# Patient Record
Sex: FEMALE | Race: WHITE
[De-identification: names, ages, dates, MRNs, and addresses within clinical notes are randomized per-mention and may not be internally consistent; named-entity substitution may affect disease eponyms.]

---

## 2018-04-06 ENCOUNTER — HOSPITAL ENCOUNTER (EMERGENCY)
Dept: HOSPITAL 33 - ED | Age: 33
Discharge: HOME | End: 2018-04-06
Payer: COMMERCIAL

## 2018-04-06 VITALS — HEART RATE: 108 BPM | OXYGEN SATURATION: 98 %

## 2018-04-06 VITALS — SYSTOLIC BLOOD PRESSURE: 107 MMHG | DIASTOLIC BLOOD PRESSURE: 81 MMHG

## 2018-04-06 DIAGNOSIS — F32.9: Primary | ICD-10-CM

## 2018-04-06 LAB
ALBUMIN SERPL-MCNC: 4 G/DL (ref 3.5–5)
ALP SERPL-CCNC: 46 U/L (ref 38–126)
ALT SERPL-CCNC: 12 U/L (ref 0–35)
AMPHETAMINES UR QL: NEGATIVE
ANION GAP SERPL CALC-SCNC: 14.5 MEQ/L (ref 5–15)
APAP SPEC-MCNC: < 10 UG/ML (ref 10–30)
AST SERPL QL: 16 U/L (ref 14–36)
BARBITURATES UR QL: NEGATIVE
BASOPHILS # BLD AUTO: 0.03 10*3/UL (ref 0–0.4)
BASOPHILS NFR BLD AUTO: 0.3 % (ref 0–0.4)
BENZODIAZ UR QL SCN: NEGATIVE
BILIRUB BLD-MCNC: 0.3 MG/DL (ref 0.2–1.3)
BUN SERPL-MCNC: 11 MG/DL (ref 7–17)
CALCIUM SPEC-MCNC: 8.8 MG/DL (ref 8.4–10.2)
CHLORIDE SERPL-SCNC: 101 MMOL/L (ref 98–107)
CO2 SERPL-SCNC: 25 MMOL/L (ref 22–30)
COCAINE UR QL SCN: NEGATIVE
CREAT SERPL-MCNC: 0.87 MG/DL (ref 0.52–1.04)
EOSINOPHIL # BLD AUTO: 0.01 10*3/UL (ref 0–0.5)
ETHANOL SERPL-MCNC: < 10 MG/DL (ref 0–9)
GLUCOSE SERPL-MCNC: 122 MG/DL (ref 74–106)
GLUCOSE UR-MCNC: NEGATIVE MG/DL
GRANULOCYTES # BLD AUTO: 8.66 10*3/UL (ref 1.4–6.9)
HCT VFR BLD AUTO: 37.3 % (ref 35–47)
HGB BLD-MCNC: 12.5 GM/DL (ref 12–16)
LYMPHOCYTES # SPEC AUTO: 0.9 10*3/UL (ref 1–4.6)
MCH RBC QN AUTO: 27.4 PG (ref 26–32)
MCHC RBC AUTO-ENTMCNC: 33.5 G/DL (ref 32–36)
METHADONE UR QL: NEGATIVE
MONOCYTES # BLD AUTO: 0.39 10*3/UL (ref 0–1.3)
NEUTROPHILS NFR BLD AUTO: 86.7 % (ref 36–66)
OPIATES UR QL: NEGATIVE
PCP UR QL CFM>20 NG/ML: NEGATIVE
PLATELET # BLD AUTO: 265 K/MM3 (ref 150–450)
POTASSIUM SERPLBLD-SCNC: 3.9 MMOL/L (ref 3.5–5.1)
PROT SERPL-MCNC: 7 G/DL (ref 6.3–8.2)
PROT UR STRIP-MCNC: NEGATIVE MG/DL
RBC # BLD AUTO: 4.56 M/MM3 (ref 4.1–5.4)
SALICYLATES SERPL-MCNC: < 1 MG/DL (ref 2–20)
SODIUM SERPL-SCNC: 136 MMOL/L (ref 137–145)
THC UR QL SCN: NEGATIVE
WBC # BLD AUTO: 10 K/MM3 (ref 4–10.5)

## 2018-04-06 PROCEDURE — 80053 COMPREHEN METABOLIC PANEL: CPT

## 2018-04-06 PROCEDURE — 90791 PSYCH DIAGNOSTIC EVALUATION: CPT

## 2018-04-06 PROCEDURE — 36415 COLL VENOUS BLD VENIPUNCTURE: CPT

## 2018-04-06 PROCEDURE — 99282 EMERGENCY DEPT VISIT SF MDM: CPT

## 2018-04-06 PROCEDURE — 81002 URINALYSIS NONAUTO W/O SCOPE: CPT

## 2018-04-06 PROCEDURE — 99283 EMERGENCY DEPT VISIT LOW MDM: CPT

## 2018-04-06 PROCEDURE — 85025 COMPLETE CBC W/AUTO DIFF WBC: CPT

## 2018-04-06 PROCEDURE — G0480 DRUG TEST DEF 1-7 CLASSES: HCPCS

## 2018-04-06 PROCEDURE — 80164 ASSAY DIPROPYLACETIC ACD TOT: CPT

## 2018-04-06 PROCEDURE — 80302: CPT

## 2018-04-06 PROCEDURE — 80307 DRUG TEST PRSMV CHEM ANLYZR: CPT

## 2018-04-06 NOTE — ERPHSYRPT
- History of Present Illness


Source: patient, police





<SYLVIA SAGASTUME - Last Filed: 04/06/18 18:37>





<ANMOL AYALA - Last Filed: 04/06/18 20:02>





- History of Present Illness


Time Seen by Provider: 04/06/18 14:37


Physician History: 





CC: mental health evaluation


Hx: Malathi is a 31 y/o transgender patient transitioning to female for the pat 

1 year. Brought to ER by police who were called to do a welfare check. Apparent 

hx of depression and chronic suicide thoughts since age 13. There was some 

comment made on line or on a message about pills.  Police were concerned so 

escorted to ER. The patient was admitted at [facility] recently and was released 

Thursday last week. Was admitted 4 days with suicidal ideation. Last Wednesday 

was at therapist and got a voice mail fired from [employer]. Was working as MSW 

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 Has been seeing an [employer] therit Janaadin Urban who 

plans to refer out for further counselling. Upset about losing job and losing 

therapist. Told a friend "not seeing a way up out of this shit." Patient states 

doesn't want to hurt self but has prior hx of cutting and hanging. Had a job 

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 





All: None


Meds: Depakote, Vistaril, Trazadone, Effexor, Estradiol, Spironolactone


Social: lives next to best friend, nonsmoker, no drugs, no alcohol





Officer called: A former coworker told police she said "it's not like you would 

have to force the pills down my throat."    (SYLVIA SAGASTUME)


Allergies/Adverse Reactions: 








No Known Drug Allergies Allergy (Verified 04/06/18 14:57)


 





Home Medications: 








Buspirone HCl [Buspirone HCl] 15 mg DAILY 04/06/18 [History]


Estradiol [Estradiol] 2 mg DAILY 04/06/18 [History]


Famotidine 20 mg*** [Pepcid 20 MG***] 20 mg DAILY 04/06/18 [History]


Mirtazapine [Mirtazapine] 15 mg DAILY 04/06/18 [History]


Olanzapine [Olanzapine] 15 mg DAILY 04/06/18 [History]


Prazosin HCl 5 mg DAILY 04/06/18 [History]


Spironolactone [Spironolactone] 100 mg DAILY 04/06/18 [History]


Trazodone HCl 150 mg*** [Desyrel 150 MG***] 150 mg DAILY 04/06/18 [History]


Venlafaxine HCl [Venlafaxine HCl] 75 mg DAILY 04/06/18 [History]








- Past Medical History


Pertinent Past Medical History: Yes


Psycho-Social History: Depression





- Social History


Smoking Status: Never smoker


Alcohol Use: None


Patient Lives Alone: Yes





<SYLVIA SAGASTUME - Last Filed: 04/06/18 18:37>





- Review of Systems


Constitutional: No Fever, No Chills


Eyes: No Symptoms


Ears, Nose, & Throat: No Symptoms


Respiratory: No Cough, No Dyspnea


Cardiac: No Chest Pain


Abdominal/Gastrointestinal: No Abdominal Pain, No Vomiting


Skin: No Rash


Neurological: No Headache


Psychological: Depression, Mood Changes, Other (denies active suicidal ideation)

, No Alcohol Abuse, No Drug Abuse


All Other Systems: Reviewed and Negative





<SYLVIA SAGASTUME - Last Filed: 04/06/18 18:37>





- Physical Exam


General Appearance: alert, other (coy answers)


Eyes, Ears, Nose, Throat Exam: normal ENT inspection, moist mucous membranes


Neck Exam: normal inspection, non-tender, supple


Respiratory Exam: normal breath sounds


Cardiovascular Exam: regular rate/rhythm


Current Suicidality: denies suicide plan


Neurological Exam: alert, oriented x 3


Appearance: appropriate appearance


Behavior/Eye Contact/Speech: alert & cooperative (partially)


Thoughts/Hallucinations: normal thought pattern


Skin Exam: warm, dry, No rash





<SYLVIA SAGASTUME - Last Filed: 04/06/18 18:37>





- Nursing Vital Signs


Nursing Vital Signs: 





 Initial Vital Signs











Pulse Rate  94 H  04/06/18 14:37


 


Respiratory Rate  18   04/06/18 14:37


 


Blood Pressure  114/82   04/06/18 14:37


 


O2 Sat by Pulse Oximetry  96   04/06/18 14:37








 Pain Scale











Pain Intensity                 0

















- Course


Nursing assessment & vital signs reviewed: Yes





<SYLVIA SAGASTUME - Last Filed: 04/06/18 18:37>


Ordered Tests: 





 Active Orders 24 hr











 Category Date Time Status


 


 Psychiatric Evaluation STAT Care  04/06/18 14:58 Active


 


 Regular Diet Diet  04/06/18 Dinner Active


 


 ACETAMINOPHEN Stat Lab  04/06/18 Completed


 


 CBC W DIFF Stat Lab  04/06/18 14:39 Completed


 


 CMP Stat Lab  04/06/18 Completed


 


 ETHYL ALCOHOL Stat Lab  04/06/18 Completed


 


 SALICYLATE Stat Lab  04/06/18 Completed


 


 UA W/RFX UR CULTURE Stat Lab  04/06/18 14:39 Completed


 


 Urine Triage Profile Stat Lab  04/06/18 14:39 Completed











Lab/Rad Data: 





 Laboratory Result Diagrams





 04/06/18 14:39 





 04/06/18 Unknown 





 Laboratory Results











  04/06/18 04/06/18 04/06/18 Range/Units





  Unknown Unknown 14:39 


 


WBC    10.0  (4.0-10.5)  K/mm3


 


RBC    4.56  (4.1-5.4)  M/mm3


 


Hgb    12.5  (12.0-16.0)  gm/dl


 


Hct    37.3  (35-47)  %


 


MCV    81.8  ()  fl


 


MCH    27.4  (26-32)  pg


 


MCHC    33.5  (32-36)  g/dl


 


RDW    15.3 H  (11.5-14.0)  %


 


Plt Count    265  (150-450)  K/mm3


 


MPV    9.9 H  (6-9.5)  fl


 


Gran %    86.7 H  (36.0-66.0)  %


 


Eos # (Auto)    0.01  (0-0.5)  


 


Absolute Lymphs (auto)    0.90 L  (1.0-4.6)  


 


Absolute Monos (auto)    0.39  (0.0-1.3)  


 


Lymphocytes %    9.0 L  (24.0-44.0)  %


 


Monocytes %    3.9  (0.0-12.0)  %


 


Eosinophils %    0.1  (0.00-5.0)  %


 


Basophils %    0.3  (0.0-0.4)  %


 


Absolute Granulocytes    8.66 H  (1.4-6.9)  


 


Basophils #    0.03  (0-0.4)  


 


Sodium   136 L   (137-145)  mmol/L


 


Potassium   3.9   (3.5-5.1)  mmol/L


 


Chloride   101   ()  mmol/L


 


Carbon Dioxide   25   (22-30)  mmol/L


 


Anion Gap   14.5   (5-15)  MEQ/L


 


BUN   11   (7-17)  mg/dL


 


Creatinine   0.87   (0.52-1.04)  mg/dL


 


Estimated GFR   > 60.0   ML/MIN


 


Glucose   122 H   ()  mg/dL


 


Calcium   8.8   (8.4-10.2)  mg/dL


 


Total Bilirubin   0.30   (0.2-1.3)  mg/dL


 


AST   16   (14-36)  U/L


 


ALT   12   (0-35)  U/L


 


Alkaline Phosphatase   46   ()  U/L


 


Serum Total Protein   7.0   (6.3-8.2)  g/dL


 


Albumin   4.0   (3.5-5.0)  g/dL


 


Ur Collection Type     


 


Urine Color     (YELLOW)  


 


Urine Appearance     (CLEAR)  


 


Urine pH     (5-6)  


 


Ur Specific Gravity     (1.005-1.025)  


 


Urine Protein     (Negative)  


 


Urine Ketones     (NEGATIVE)  


 


Urine Blood     (0-5)  Bao/ul


 


Urine Nitrite     (NEGATIVE)  


 


Urine Bilirubin     (NEGATIVE)  


 


Urine Urobilinogen     (0-1)  mg/dL


 


Ur Leukocyte Esterase     (NEGATIVE)  


 


Urine Culture Reflexed     (NO)  


 


Urine Glucose     (NEGATIVE)  mg/dL


 


Salicylates   < 1.0 L   (2-20)  mg/dL


 


Urine Opiates Level     (NEGATIVE)  


 


Ur Methadone     (NEGATIVE)  


 


Acetaminophen   < 10 L   (10-30)  ug/ml


 


Urine Barbiturates     (NEGATIVE)  


 


Valproic Acid  44.4 L    ()  ug/mL


 


Ur Phencyclidine (PCP)     (NEGATIVE)  


 


Urine Amphetamine     (NEGATIVE)  


 


U Benzodiazepine Level     (NEGATIVE)  


 


Urine Cocaine     (NEGATIVE)  


 


Urine Marijuana (THC)     (NEGATIVE)  


 


Ethyl Alcohol   < 10 H   (0-9)  mg/dL


 


Specimen Received     














  04/06/18 04/06/18 Range/Units





  14:39 14:39 


 


WBC    (4.0-10.5)  K/mm3


 


RBC    (4.1-5.4)  M/mm3


 


Hgb    (12.0-16.0)  gm/dl


 


Hct    (35-47)  %


 


MCV    ()  fl


 


MCH    (26-32)  pg


 


MCHC    (32-36)  g/dl


 


RDW    (11.5-14.0)  %


 


Plt Count    (150-450)  K/mm3


 


MPV    (6-9.5)  fl


 


Gran %    (36.0-66.0)  %


 


Eos # (Auto)    (0-0.5)  


 


Absolute Lymphs (auto)    (1.0-4.6)  


 


Absolute Monos (auto)    (0.0-1.3)  


 


Lymphocytes %    (24.0-44.0)  %


 


Monocytes %    (0.0-12.0)  %


 


Eosinophils %    (0.00-5.0)  %


 


Basophils %    (0.0-0.4)  %


 


Absolute Granulocytes    (1.4-6.9)  


 


Basophils #    (0-0.4)  


 


Sodium    (137-145)  mmol/L


 


Potassium    (3.5-5.1)  mmol/L


 


Chloride    ()  mmol/L


 


Carbon Dioxide    (22-30)  mmol/L


 


Anion Gap    (5-15)  MEQ/L


 


BUN    (7-17)  mg/dL


 


Creatinine    (0.52-1.04)  mg/dL


 


Estimated GFR    ML/MIN


 


Glucose    ()  mg/dL


 


Calcium    (8.4-10.2)  mg/dL


 


Total Bilirubin    (0.2-1.3)  mg/dL


 


AST    (14-36)  U/L


 


ALT    (0-35)  U/L


 


Alkaline Phosphatase    ()  U/L


 


Serum Total Protein    (6.3-8.2)  g/dL


 


Albumin    (3.5-5.0)  g/dL


 


Ur Collection Type   VOID  


 


Urine Color   DARK YELLOW  (YELLOW)  


 


Urine Appearance   CLEAR  (CLEAR)  


 


Urine pH   6.0  (5-6)  


 


Ur Specific Gravity   1.015  (1.005-1.025)  


 


Urine Protein   NEGATIVE  (Negative)  


 


Urine Ketones   MODERATE  (NEGATIVE)  


 


Urine Blood   NEGATIVE  (0-5)  Bao/ul


 


Urine Nitrite   NEGATIVE  (NEGATIVE)  


 


Urine Bilirubin   NEGATIVE  (NEGATIVE)  


 


Urine Urobilinogen   NORMAL  (0-1)  mg/dL


 


Ur Leukocyte Esterase   NEGATIVE  (NEGATIVE)  


 


Urine Culture Reflexed   NO  (NO)  


 


Urine Glucose   NEGATIVE  (NEGATIVE)  mg/dL


 


Salicylates    (2-20)  mg/dL


 


Urine Opiates Level  NEGATIVE   (NEGATIVE)  


 


Ur Methadone  NEGATIVE   (NEGATIVE)  


 


Acetaminophen    (10-30)  ug/ml


 


Urine Barbiturates  NEGATIVE   (NEGATIVE)  


 


Valproic Acid    ()  ug/mL


 


Ur Phencyclidine (PCP)  NEGATIVE   (NEGATIVE)  


 


Urine Amphetamine  NEGATIVE   (NEGATIVE)  


 


U Benzodiazepine Level  NEGATIVE   (NEGATIVE)  


 


Urine Cocaine  NEGATIVE   (NEGATIVE)  


 


Urine Marijuana (THC)  NEGATIVE   (NEGATIVE)  


 


Ethyl Alcohol    (0-9)  mg/dL


 


Specimen Received   04/06/18 2435  














<SYLVIA SAGASTUME - Last Filed: 04/06/18 18:37>





<ANMOL AYALA - Last Filed: 04/06/18 20:02>





- Progress


Progress Note: 





04/06/18 14:57


Pt agreed to [facility] Tele Consult at Police request.





04/06/18 18:19


Pt stable. Meal tray provided. Await [facility] consult.





04/06/18 19:00


Await  consult. Report to Dr Ayala for further care and disposition.


 (SYLVIA SAGASTUME)








04/06/18 19:59


PT EXAMINED BY DR AYALA 1947: PERRL, EOMI, PHARYNX PINK, LUNGS CLEAR, NO 

CARDIAC RUB, ABDOMINAL B.S. NORMAL, NO ANKLE EDEMA, ALERT & COOPERATIVE. ER 

NURSE STATES TELE-THERAPIST SAYS PT MAY GO HOME. (ANMOL AYALA)





<SYLVIA SAGASTUME - Last Filed: 04/06/18 18:37>





- Departure


Time of Disposition: 20:02


Departure Disposition: Home


Critical Care Time: No





<ANMOL AYALA - Last Filed: 04/06/18 20:02>





- Departure


Clinical Impression: 


 DEPRESSION





Condition: Stable


Referrals: 


DOCTOR,NO FAMILY [Primary Care Provider] - 


Instructions:  Depression


Additional Instructions: 


FOLLOW UP WITH PRIVATE DOCTOR TOMORROW.


FOLLOW UP WITH THERAPIST.